# Patient Record
Sex: FEMALE | Race: ASIAN
[De-identification: names, ages, dates, MRNs, and addresses within clinical notes are randomized per-mention and may not be internally consistent; named-entity substitution may affect disease eponyms.]

---

## 2023-05-04 PROBLEM — Z00.00 ENCOUNTER FOR PREVENTIVE HEALTH EXAMINATION: Status: ACTIVE | Noted: 2023-05-04

## 2023-05-10 ENCOUNTER — APPOINTMENT (OUTPATIENT)
Dept: SURGERY | Facility: CLINIC | Age: 48
End: 2023-05-10
Payer: COMMERCIAL

## 2023-05-10 ENCOUNTER — TRANSCRIPTION ENCOUNTER (OUTPATIENT)
Age: 48
End: 2023-05-10

## 2023-05-10 VITALS
TEMPERATURE: 97.1 F | SYSTOLIC BLOOD PRESSURE: 122 MMHG | BODY MASS INDEX: 22.36 KG/M2 | HEIGHT: 64 IN | HEART RATE: 68 BPM | OXYGEN SATURATION: 99 % | WEIGHT: 131 LBS | DIASTOLIC BLOOD PRESSURE: 88 MMHG

## 2023-05-10 DIAGNOSIS — L30.9 DERMATITIS, UNSPECIFIED: ICD-10-CM

## 2023-05-10 DIAGNOSIS — D25.9 LEIOMYOMA OF UTERUS, UNSPECIFIED: ICD-10-CM

## 2023-05-10 DIAGNOSIS — Z78.9 OTHER SPECIFIED HEALTH STATUS: ICD-10-CM

## 2023-05-10 PROCEDURE — 99203 OFFICE O/P NEW LOW 30 MIN: CPT

## 2023-05-10 RX ORDER — DUPILUMAB 200 MG/1.14ML
INJECTION, SOLUTION SUBCUTANEOUS
Refills: 0 | Status: ACTIVE | COMMUNITY

## 2023-05-10 NOTE — HISTORY OF PRESENT ILLNESS
[de-identified] :  the patient comes with her  for management following her recent right breast biopsy.  She notes no recent symptoms or changes in either breast other than some occasional vague bilateral breast pain and she has no prior history of any other breast disorders or breast surgery.  A recent screening mammogram showed 2 small clusters of microcalcification in the right upper outer quadrant and a right breast nodule at 5:00.  Diagnostic imaging confirmed these findings.  The nodule was thought to be probably benign and stereotactic biopsy of the larger, more anterior grouping showed classical LCIS.  She has no family history of breast cancer.\par \par Last GYN examination was 5 months ago, menarche was at age 9 and first pregnancy was at age 28.  She is a  who nursed for 1 year and never used hormones.  Her menses are currently irregular and she believes that she is in early menopause

## 2023-05-10 NOTE — DATA REVIEWED
[FreeTextEntry1] :  reviewed reports and images from recent breast imaging studies and core biopsy as noted above

## 2023-05-10 NOTE — ASSESSMENT
[FreeTextEntry1] :  clustered calcifications in the right breast showing classical LCIS, with an additional smaller cluster posterior to this which has not been biopsied, and a small nonpalpable nodule in the right 5:00 axis for which 6-month follow-up has been advised.  The nature of classical LCIS was described in full and she understands that this is a disease without actual malignant potential or the need for definitive surgical excision.  The breast imaging studies and biopsy slides will be reviewed at this location, and a bilateral breast MRI will be obtained for further evaluation.  She understands  that she may require additional biopsies based on the MRI results, and that the second smaller area of microcalcifications may also need biopsy.  We also discussed that this disease  represents an increased risk for the later development of ductal carcinoma of the breast, and that she will require regular surveillance, and possible endocrine therapy for breast cancer risk reduction.  After the current work-up is completed, a medical oncology evaluation will be obtained in that regard.  All their questions were answered and they are happy with the assessment and plan.  They will return here in 3 to 4 weeks for reevaluation.

## 2023-05-10 NOTE — PHYSICAL EXAM
[Normal Thyroid] : the thyroid was normal [Normal Breath Sounds] : Normal breath sounds [Normal Heart Sounds] : normal heart sounds [Normal Rate and Rhythm] : normal rate and rhythm [No HSM] : no hepatosplenomegaly [de-identified] :  thin and healthy [de-identified] :  no adenopathy [de-identified] :  small and symmetrical, with no nipple discharge, nipple retraction, or suspicious skin change.  Healing core biopsy puncture  high in the right upper outer quadrant, and just below this is a vague area of increased density consistent with core biopsy site reaction.  No discrete masses or suspicious areas are palpable bilaterally.  No axillary adenopathy bilaterally

## 2023-05-19 ENCOUNTER — NON-APPOINTMENT (OUTPATIENT)
Age: 48
End: 2023-05-19

## 2023-05-22 ENCOUNTER — LABORATORY RESULT (OUTPATIENT)
Age: 48
End: 2023-05-22

## 2023-06-13 ENCOUNTER — RESULT REVIEW (OUTPATIENT)
Age: 48
End: 2023-06-13

## 2023-06-13 ENCOUNTER — OUTPATIENT (OUTPATIENT)
Dept: OUTPATIENT SERVICES | Facility: HOSPITAL | Age: 48
LOS: 1 days | End: 2023-06-13
Payer: COMMERCIAL

## 2023-06-13 DIAGNOSIS — Z85.3 PERSONAL HISTORY OF MALIGNANT NEOPLASM OF BREAST: ICD-10-CM

## 2023-06-13 DIAGNOSIS — N63.0 UNSPECIFIED LUMP IN UNSPECIFIED BREAST: ICD-10-CM

## 2023-06-13 PROCEDURE — 77049 MRI BREAST C-+ W/CAD BI: CPT | Mod: 26

## 2023-06-13 PROCEDURE — C8937: CPT

## 2023-06-13 PROCEDURE — 77049 MRI BREAST C-+ W/CAD BI: CPT

## 2023-06-13 PROCEDURE — A9579: CPT

## 2023-06-14 ENCOUNTER — NON-APPOINTMENT (OUTPATIENT)
Age: 48
End: 2023-06-14

## 2023-06-14 DIAGNOSIS — N63.0 UNSPECIFIED LUMP IN UNSPECIFIED BREAST: ICD-10-CM

## 2023-06-20 ENCOUNTER — APPOINTMENT (OUTPATIENT)
Dept: SURGERY | Facility: CLINIC | Age: 48
End: 2023-06-20
Payer: COMMERCIAL

## 2023-06-20 VITALS
TEMPERATURE: 97 F | DIASTOLIC BLOOD PRESSURE: 68 MMHG | OXYGEN SATURATION: 99 % | BODY MASS INDEX: 22.36 KG/M2 | SYSTOLIC BLOOD PRESSURE: 110 MMHG | HEIGHT: 64 IN | HEART RATE: 78 BPM | WEIGHT: 131 LBS

## 2023-06-20 PROCEDURE — 99213 OFFICE O/P EST LOW 20 MIN: CPT

## 2023-06-20 NOTE — ASSESSMENT
[FreeTextEntry1] :   Right breast cLCIS, arising in an area of clustered microcalcifications, with a second area that is as yet indeterminate.    We again reviewed the nature of LCIS and that it does not represent a malignant process and in and of itself does not require surgical resection.  We will arrange for diagnostic imaging of the right breast as advised, along with a medical oncology evaluation for possible endocrine therapy and risk reduction, as she does have an increased risk for the later development of ductal carcinoma.  She has a concern about any interaction with her Dupixent, which I do not think will be a problem, but which she can discuss with the oncologist at the time of the consultation.  She understands that she may require additional biopsies based on the diagnostic mammography, but barring any other issues, she will return here in 3 months for reexam, or sooner as needed.  She  understands the need for regular breast surveillance and imaging studies in light of her increased risk status.  All her questions were answered and she understands and agrees.

## 2023-06-20 NOTE — HISTORY OF PRESENT ILLNESS
[de-identified] : The patient returns for further discussion regarding the management of her right breast microcalcifications and LCIS, status post stereotactic core biopsy.  She notes no new symptoms or changes in either breast since she was last seen.

## 2023-06-20 NOTE — DATA REVIEWED
[FreeTextEntry1] :   Biopsy of the more anterior group of right breast microcalcifications showed classical LCIS.  Local review of the images suggested diagnostic imaging of the right breast to further evaluate the posterior calcifications.\par \par Bilateral breast MRI showed no other areas of concern in either breast or axilla.

## 2023-06-20 NOTE — PHYSICAL EXAM
[de-identified] :   Thin and healthy [de-identified] :  no adenopathy [de-identified] :  small and symmetrical, with no nipple discharge, nipple retraction, suspicious skin change bilaterally.  No new masses or suspicious areas palpable in either breast.  Small residual palpable density in the right upper outer quadrant related to the recent core biopsy, improved since last office visit.  No axillary adenopathy bilaterally.

## 2023-06-20 NOTE — REASON FOR VISIT
[Follow-Up: _____] : a [unfilled] follow-up visit [[employer] ] : provided by [employer]   [Interpreters_IDNumber] : [id number] [Interpreters_FullName] : althea [TWNoteComboBox1] : Bulgarian

## 2023-06-27 ENCOUNTER — OUTPATIENT (OUTPATIENT)
Dept: OUTPATIENT SERVICES | Facility: HOSPITAL | Age: 48
LOS: 1 days | End: 2023-06-27
Payer: COMMERCIAL

## 2023-06-27 ENCOUNTER — RESULT REVIEW (OUTPATIENT)
Age: 48
End: 2023-06-27

## 2023-06-27 DIAGNOSIS — D05.01 LOBULAR CARCINOMA IN SITU OF RIGHT BREAST: ICD-10-CM

## 2023-06-27 DIAGNOSIS — R92.8 OTHER ABNORMAL AND INCONCLUSIVE FINDINGS ON DIAGNOSTIC IMAGING OF BREAST: ICD-10-CM

## 2023-06-27 PROCEDURE — 77065 DX MAMMO INCL CAD UNI: CPT | Mod: 26,RT

## 2023-06-27 PROCEDURE — G0279: CPT | Mod: 26

## 2023-06-27 PROCEDURE — 77065 DX MAMMO INCL CAD UNI: CPT | Mod: RT

## 2023-06-27 PROCEDURE — G0279: CPT

## 2023-06-28 DIAGNOSIS — R92.8 OTHER ABNORMAL AND INCONCLUSIVE FINDINGS ON DIAGNOSTIC IMAGING OF BREAST: ICD-10-CM

## 2023-07-26 ENCOUNTER — APPOINTMENT (OUTPATIENT)
Dept: HEMATOLOGY ONCOLOGY | Facility: CLINIC | Age: 48
End: 2023-07-26
Payer: COMMERCIAL

## 2023-07-26 ENCOUNTER — OUTPATIENT (OUTPATIENT)
Dept: OUTPATIENT SERVICES | Facility: HOSPITAL | Age: 48
LOS: 1 days | End: 2023-07-26
Payer: COMMERCIAL

## 2023-07-26 VITALS
WEIGHT: 130 LBS | DIASTOLIC BLOOD PRESSURE: 63 MMHG | TEMPERATURE: 97.2 F | HEART RATE: 63 BPM | HEIGHT: 64 IN | SYSTOLIC BLOOD PRESSURE: 122 MMHG | BODY MASS INDEX: 22.2 KG/M2

## 2023-07-26 DIAGNOSIS — D05.01 LOBULAR CARCINOMA IN SITU OF RIGHT BREAST: ICD-10-CM

## 2023-07-26 PROCEDURE — 99204 OFFICE O/P NEW MOD 45 MIN: CPT

## 2023-07-27 DIAGNOSIS — D05.01 LOBULAR CARCINOMA IN SITU OF RIGHT BREAST: ICD-10-CM

## 2023-07-27 NOTE — HISTORY OF PRESENT ILLNESS
[de-identified] : BART is a 47 year old premenopausal female with right breast microcalcifications and LCIS, referred by Dr Horvath for possible endocrine therapy and risk reduction. She does not have significant medical history except for uterine myomectomy. \par \par Core biopsy of the right breast calcification on 4/18/23 showed lobular carcinoma in situ, lobular type and the tissue showed atypical lobular hyperplasia. Right dx mammo on 7/27/23 was BI-RADS 3, showed Right breast calcifications with Surgical management recommended. If not surgically removed, short-term follow-up is recommended in 6 months.\par \par She denies any new complaints.  Patient denies any new palpable breast lumps or pain, denies skin changes, denies nipple discharge.  Patient denies cough, shortness of breath, denies fever, denies bone pain.\par \par

## 2023-07-27 NOTE — ASSESSMENT
[FreeTextEntry1] : BART is a 47 year old female with right breast LCIS,  She does not have significant medical history except for eczema and she is on Dupixent.\par \par \par RECOMMENDATIONS.\par I had a detailed discussion with the pt about the natural history and management of LCIS.\par She was advised that LCIS is a risk factor for development of invasive breast cancer.\par The relative risk of developing an invasive cancer in women with LCIS is approximately 7 to 11 fold higher than for women without LCIS.. The absolute risk is approximately 1 percent per year and appears to be lifelong.\par \par Options for chemoprophylaxis  with Tamoxifen 5mg daily X 3 yrs were discussed\par \par Side effects of Tamoxifen including hot flashes, menstrual irregularities, weight gain, mood changes, DVT, cataracts and uterine cancer were discussed.\par \par \par We also discussed surveillance with annual mammograms and  MRI breast.\par  She was also informed that there are no reported interactions between Dupixent and Tamoxifen.\par \par Pt is not sure if she wants to proceed with Tamoxifen. She will inform us of her decision\par All of her questions and concerns were addressed.\par \par \par

## 2023-07-27 NOTE — REVIEW OF SYSTEMS
[Diarrhea: Grade 0] : Diarrhea: Grade 0 [Skin Rash] : skin rash [Negative] : Allergic/Immunologic [FreeTextEntry2] : last GYN 2023,

## 2023-07-27 NOTE — PHYSICAL EXAM
[Fully active, able to carry on all pre-disease performance without restriction] : Status 0 - Fully active, able to carry on all pre-disease performance without restriction [Normal] : affect appropriate [de-identified] : eczematous rash on face and other areas

## 2023-07-27 NOTE — RESULTS/DATA
[FreeTextEntry1] : ACC: [id number]     EXAM:  MG MAMMO DIAG W [facility] RT#   ORDERED BY: YOUSUF GAN\par PROCEDURE DATE:  06/27/2023\par \par INTERPRETATION:  CLINICAL HISTORY: Additional imaging requested from consult of outside mammograms. Further evaluation requested for right breast calcifications.\par \par FAMILY HISTORY: None.\par \par The patient reports her last clinical breast examination was performed within the past year.\par \par COMPARISON: 3/22/2023.\par \par BREAST COMPOSITION: The breasts are heterogeneously dense, which may obscure small masses.\par \par VIEWS: 2D/tomosynthesis CC/MLO and spot magnification views of the right breast. Computer-aided detection was utilized by the radiologists in the interpretation of this examination.\par \par FINDINGS:\par There are 3 groups of amorphous calcifications in the upper outer quadrant of the right breast. Biopsy clip is noted superior and lateral to the calcifications consistent with the biopsy-proven atypia. Surgical management is recommended. No suspicious masses or areas of architectural distortion are seen.\par \par IMPRESSION:\par 1. Right breast calcifications as above. Surgical management is recommended. If not surgically removed, short-term follow-up is recommended in 6 months.\par 2. The patient will also be due for short-term follow-up of the probably benign right breast mass identified on outside imaging.\par \par Recommendation: Follow-up unilateral diagnostic mammogram and ultrasound in 3 months.\par \par BI-RADS category 3: Probably Benign\par \par

## 2023-09-18 ENCOUNTER — APPOINTMENT (OUTPATIENT)
Dept: SURGERY | Facility: CLINIC | Age: 48
End: 2023-09-18
Payer: COMMERCIAL

## 2023-09-18 VITALS
HEART RATE: 80 BPM | TEMPERATURE: 97.8 F | BODY MASS INDEX: 22.2 KG/M2 | WEIGHT: 130 LBS | OXYGEN SATURATION: 99 % | HEIGHT: 64 IN | SYSTOLIC BLOOD PRESSURE: 102 MMHG | DIASTOLIC BLOOD PRESSURE: 74 MMHG

## 2023-09-18 DIAGNOSIS — R92.0 MAMMOGRAPHIC MICROCALCIFICATION FOUND ON DIAGNOSTIC IMAGING OF BREAST: ICD-10-CM

## 2023-09-18 PROCEDURE — 99213 OFFICE O/P EST LOW 20 MIN: CPT

## 2023-11-07 ENCOUNTER — NON-APPOINTMENT (OUTPATIENT)
Age: 48
End: 2023-11-07

## 2023-12-01 ENCOUNTER — OUTPATIENT (OUTPATIENT)
Dept: OUTPATIENT SERVICES | Facility: HOSPITAL | Age: 48
LOS: 1 days | End: 2023-12-01
Payer: COMMERCIAL

## 2023-12-01 ENCOUNTER — RESULT REVIEW (OUTPATIENT)
Age: 48
End: 2023-12-01

## 2023-12-01 DIAGNOSIS — Z85.3 PERSONAL HISTORY OF MALIGNANT NEOPLASM OF BREAST: ICD-10-CM

## 2023-12-01 DIAGNOSIS — D05.01 LOBULAR CARCINOMA IN SITU OF RIGHT BREAST: ICD-10-CM

## 2023-12-01 PROCEDURE — 77049 MRI BREAST C-+ W/CAD BI: CPT

## 2023-12-01 PROCEDURE — C8937: CPT

## 2023-12-01 PROCEDURE — A9579: CPT

## 2023-12-01 PROCEDURE — 77049 MRI BREAST C-+ W/CAD BI: CPT | Mod: 26

## 2023-12-02 DIAGNOSIS — D05.01 LOBULAR CARCINOMA IN SITU OF RIGHT BREAST: ICD-10-CM

## 2024-01-23 ENCOUNTER — OUTPATIENT (OUTPATIENT)
Dept: OUTPATIENT SERVICES | Facility: HOSPITAL | Age: 49
LOS: 1 days | End: 2024-01-23
Payer: COMMERCIAL

## 2024-01-23 ENCOUNTER — APPOINTMENT (OUTPATIENT)
Dept: HEMATOLOGY ONCOLOGY | Facility: CLINIC | Age: 49
End: 2024-01-23
Payer: COMMERCIAL

## 2024-01-23 VITALS
DIASTOLIC BLOOD PRESSURE: 67 MMHG | SYSTOLIC BLOOD PRESSURE: 117 MMHG | BODY MASS INDEX: 23.56 KG/M2 | HEIGHT: 64 IN | HEART RATE: 63 BPM | TEMPERATURE: 98 F | WEIGHT: 138 LBS

## 2024-01-23 DIAGNOSIS — D05.01 LOBULAR CARCINOMA IN SITU OF RIGHT BREAST: ICD-10-CM

## 2024-01-23 PROCEDURE — 99214 OFFICE O/P EST MOD 30 MIN: CPT

## 2024-01-23 RX ORDER — TAMOXIFEN CITRATE 10 MG/1
10 TABLET, FILM COATED ORAL DAILY
Qty: 45 | Refills: 1 | Status: ACTIVE | COMMUNITY
Start: 2023-07-26 | End: 1900-01-01

## 2024-01-23 NOTE — HISTORY OF PRESENT ILLNESS
[de-identified] : BART is a 47 year old premenopausal female with right breast microcalcifications and LCIS, referred by Dr Horvath for possible endocrine therapy and risk reduction. She does not have significant medical history except for uterine myomectomy. \par  \par  Core biopsy of the right breast calcification on 4/18/23 showed lobular carcinoma in situ, lobular type and the tissue showed atypical lobular hyperplasia. Right dx mammo on 7/27/23 was BI-RADS 3, showed Right breast calcifications with Surgical management recommended. If not surgically removed, short-term follow-up is recommended in 6 months.\par  \par  She denies any new complaints.  Patient denies any new palpable breast lumps or pain, denies skin changes, denies nipple discharge.  Patient denies cough, shortness of breath, denies fever, denies bone pain.\par  \par   [de-identified] : 1/23/24 Pt is here for a follow up. Accompanied by family member. She does have menstrual periods and they are normal. Her psoriasis is under control as well.  She states she had never had a colonoscopy. Her last Gynecologist visit was last year in March 2023.She is otherwise well.  She is currently not taking Tamoxifen and missed about two weeks because of insurance and pharmacy change. She denies any new complaints.

## 2024-01-23 NOTE — REVIEW OF SYSTEMS
[Diarrhea: Grade 0] : Diarrhea: Grade 0 [Skin Rash] : skin rash [Negative] : Heme/Lymph [FreeTextEntry2] : last GYN 2023,

## 2024-01-23 NOTE — ASSESSMENT
[FreeTextEntry1] : BART is a 48 year old female with right breast LCIS, She does not have significant medical history except for eczema and she is on Dupixent.      RECOMMENDATIONS.  I had a detailed discussion with the pt about the natural history and management of LCIS.  She was advised that LCIS is a risk factor for development of invasive breast cancer.  The relative risk of developing an invasive cancer in women with LCIS is approximately 7 to 11 fold higher than for women without LCIS.. The absolute risk is approximately 1 percent per year and appears to be lifelong.    Options for chemoprophylaxis with Tamoxifen 5mg daily X 3 yrs were discussed. She decided to take it and has been on it since 7/23    Side effects of Tamoxifen including hot flashes, menstrual irregularities, weight gain, mood changes, DVT, cataracts and uterine cancer were discussed.      We also discussed surveillance with annual mammograms and MRI breast.   She was also informed that there are no reported interactions between Dupixent and Tamoxifen.    Pt to continue Tamoxifen.  MRI from 12/23 reviewed BIRADS-2  Mammo in 6/23 BIRADS 3 Due for a mammogram, recommended follow up with Dr Horvath regarding Mammogram  Follow up with gastroenterologist and colonoscopy is recommended. Emphasized on follow up with Gynecologist. All of her questions and concerns were addressed.

## 2024-01-23 NOTE — PHYSICAL EXAM
[Fully active, able to carry on all pre-disease performance without restriction] : Status 0 - Fully active, able to carry on all pre-disease performance without restriction [Normal] : affect appropriate [de-identified] : eczematous rash on face and other areas

## 2024-01-24 DIAGNOSIS — D05.01 LOBULAR CARCINOMA IN SITU OF RIGHT BREAST: ICD-10-CM

## 2024-03-18 ENCOUNTER — APPOINTMENT (OUTPATIENT)
Dept: SURGERY | Facility: CLINIC | Age: 49
End: 2024-03-18
Payer: COMMERCIAL

## 2024-03-18 VITALS
SYSTOLIC BLOOD PRESSURE: 112 MMHG | HEART RATE: 67 BPM | BODY MASS INDEX: 23.05 KG/M2 | TEMPERATURE: 97.1 F | OXYGEN SATURATION: 99 % | HEIGHT: 64 IN | WEIGHT: 135 LBS | DIASTOLIC BLOOD PRESSURE: 68 MMHG

## 2024-03-18 DIAGNOSIS — D05.01 LOBULAR CARCINOMA IN SITU OF RIGHT BREAST: ICD-10-CM

## 2024-03-18 PROCEDURE — 99212 OFFICE O/P EST SF 10 MIN: CPT

## 2024-03-18 NOTE — HISTORY OF PRESENT ILLNESS
[de-identified] : The patient returns for her scheduled breast surveillance visit, and she notes no new symptoms or changes in either breast.  She remains on tamoxifen 5 mg/day per Dr. Wills and has no adverse effects.

## 2024-03-18 NOTE — PHYSICAL EXAM
[de-identified] : Thin and healthy [de-identified] : No adenopathy [de-identified] : Small and symmetrical, no nipple discharge, nipple retraction, suspicious skin change bilaterally.  No discrete masses or suspicious areas are palpable in either breast.  No axillary adenopathy bilaterally.

## 2024-07-05 ENCOUNTER — RESULT REVIEW (OUTPATIENT)
Age: 49
End: 2024-07-05

## 2024-07-05 ENCOUNTER — OUTPATIENT (OUTPATIENT)
Dept: OUTPATIENT SERVICES | Facility: HOSPITAL | Age: 49
LOS: 1 days | End: 2024-07-05
Payer: COMMERCIAL

## 2024-07-05 DIAGNOSIS — R92.2 INCONCLUSIVE MAMMOGRAM: ICD-10-CM

## 2024-07-05 DIAGNOSIS — R92.8 OTHER ABNORMAL AND INCONCLUSIVE FINDINGS ON DIAGNOSTIC IMAGING OF BREAST: ICD-10-CM

## 2024-07-05 PROCEDURE — 76642 ULTRASOUND BREAST LIMITED: CPT | Mod: 26,RT

## 2024-07-05 PROCEDURE — 77062 BREAST TOMOSYNTHESIS BI: CPT | Mod: 26

## 2024-07-05 PROCEDURE — G0279: CPT | Mod: 26

## 2024-07-05 PROCEDURE — 77066 DX MAMMO INCL CAD BI: CPT | Mod: 26

## 2024-07-05 PROCEDURE — 76642 ULTRASOUND BREAST LIMITED: CPT | Mod: RT

## 2024-07-05 PROCEDURE — 77066 DX MAMMO INCL CAD BI: CPT

## 2024-07-05 PROCEDURE — G0279: CPT

## 2024-07-06 DIAGNOSIS — R92.8 OTHER ABNORMAL AND INCONCLUSIVE FINDINGS ON DIAGNOSTIC IMAGING OF BREAST: ICD-10-CM

## 2024-07-24 ENCOUNTER — OUTPATIENT (OUTPATIENT)
Dept: OUTPATIENT SERVICES | Facility: HOSPITAL | Age: 49
LOS: 1 days | End: 2024-07-24
Payer: COMMERCIAL

## 2024-07-24 ENCOUNTER — APPOINTMENT (OUTPATIENT)
Age: 49
End: 2024-07-24
Payer: COMMERCIAL

## 2024-07-24 VITALS
TEMPERATURE: 98.2 F | SYSTOLIC BLOOD PRESSURE: 112 MMHG | BODY MASS INDEX: 22.88 KG/M2 | HEART RATE: 99 BPM | OXYGEN SATURATION: 99 % | DIASTOLIC BLOOD PRESSURE: 73 MMHG | WEIGHT: 134.04 LBS | HEIGHT: 64 IN

## 2024-07-24 DIAGNOSIS — D05.01 LOBULAR CARCINOMA IN SITU OF RIGHT BREAST: ICD-10-CM

## 2024-07-24 DIAGNOSIS — R92.0 MAMMOGRAPHIC MICROCALCIFICATION FOUND ON DIAGNOSTIC IMAGING OF BREAST: ICD-10-CM

## 2024-07-24 PROCEDURE — 99214 OFFICE O/P EST MOD 30 MIN: CPT

## 2024-07-24 PROCEDURE — G2211 COMPLEX E/M VISIT ADD ON: CPT | Mod: NC

## 2024-07-24 NOTE — PHYSICAL EXAM
[Fully active, able to carry on all pre-disease performance without restriction] : Status 0 - Fully active, able to carry on all pre-disease performance without restriction [Normal] : affect appropriate [de-identified] : eczematous rash on face and other areas

## 2024-07-24 NOTE — ASSESSMENT
[FreeTextEntry1] : BART is a 48 year old female with right breast LCIS, She does not have significant medical history except for eczema and she is on Dupixent.      RECOMMENDATIONS.  I had a detailed discussion with the pt about the natural history and management of LCIS.  She was advised that LCIS is a risk factor for development of invasive breast cancer.  The relative risk of developing an invasive cancer in women with LCIS is approximately 7 to 11 fold higher than for women without LCIS.. The absolute risk is approximately 1 percent per year and appears to be lifelong.    Options for chemoprophylaxis with Tamoxifen 5mg daily X 3 yrs were discussed. She decided to take it and has been on it since 7/23    Side effects of Tamoxifen including hot flashes, menstrual irregularities, weight gain, mood changes, DVT, cataracts and uterine cancer were discussed.      We also discussed surveillance with mammograms and MRI breast.   She was also informed that there are no reported interactions between Dupixent and Tamoxifen.    Pt to continue Tamoxifen.  MRI from 12/23 reviewed BIRADS-2  Mammo in 7/24BIRADS 3 Due for a RT diag mammogram in 3 m, recommended follow up with Dr Horvath.  Follow up with gastroenterologist and colonoscopy is recommended. Emphasized on follow up with Gynecologist. All of her questions and concerns were addressed.

## 2024-07-24 NOTE — HISTORY OF PRESENT ILLNESS
[de-identified] : BART is a 47 year old premenopausal female with right breast microcalcifications and LCIS, referred by Dr Horvath for possible endocrine therapy and risk reduction. She does not have significant medical history except for uterine myomectomy. \par  \par  Core biopsy of the right breast calcification on 4/18/23 showed lobular carcinoma in situ, lobular type and the tissue showed atypical lobular hyperplasia. Right dx mammo on 7/27/23 was BI-RADS 3, showed Right breast calcifications with Surgical management recommended. If not surgically removed, short-term follow-up is recommended in 6 months.\par  \par  She denies any new complaints.  Patient denies any new palpable breast lumps or pain, denies skin changes, denies nipple discharge.  Patient denies cough, shortness of breath, denies fever, denies bone pain.\par  \par   [de-identified] : 1/23/24 Pt is here for a follow up. Accompanied by family member. She does have menstrual periods and they are normal. Her psoriasis is under control as well.  She states she had never had a colonoscopy. Her last Gynecologist visit was last year in March 2023.She is otherwise well.  She is currently not taking Tamoxifen and missed about two weeks because of insurance and pharmacy change. She denies any new complaints.  7/24/24  Patient here for follow up, feeling well.  She denies any new complaints.  Patient denies any new palpable breast lumps or pain, denies skin changes, denies nipple discharge.  Patient denies cough, shortness of breath, denies fever, denies bone pain. Taking Tamoxifen and tolerating it well.

## 2024-07-25 DIAGNOSIS — R92.0 MAMMOGRAPHIC MICROCALCIFICATION FOUND ON DIAGNOSTIC IMAGING OF BREAST: ICD-10-CM

## 2024-09-16 ENCOUNTER — APPOINTMENT (OUTPATIENT)
Dept: SURGERY | Facility: CLINIC | Age: 49
End: 2024-09-16
Payer: COMMERCIAL

## 2024-09-16 VITALS
HEART RATE: 80 BPM | TEMPERATURE: 97 F | OXYGEN SATURATION: 98 % | HEIGHT: 64 IN | SYSTOLIC BLOOD PRESSURE: 128 MMHG | WEIGHT: 134 LBS | DIASTOLIC BLOOD PRESSURE: 70 MMHG | BODY MASS INDEX: 22.88 KG/M2

## 2024-09-16 DIAGNOSIS — R92.0 MAMMOGRAPHIC MICROCALCIFICATION FOUND ON DIAGNOSTIC IMAGING OF BREAST: ICD-10-CM

## 2024-09-16 DIAGNOSIS — D05.01 LOBULAR CARCINOMA IN SITU OF RIGHT BREAST: ICD-10-CM

## 2024-09-16 PROCEDURE — 99212 OFFICE O/P EST SF 10 MIN: CPT

## 2024-09-17 ENCOUNTER — TRANSCRIPTION ENCOUNTER (OUTPATIENT)
Age: 49
End: 2024-09-17

## 2024-09-17 NOTE — ASSESSMENT
[FreeTextEntry1] : Benign breast surveillance examination.  The patient will return in 6 months for reexam, or sooner as needed.  A right diagnostic mammogram and bilateral breast MRI are due in January and an appropriate requisition was provided.  All her questions were answered.

## 2024-09-17 NOTE — PHYSICAL EXAM
[de-identified] : Thin and healthy [de-identified] : No adenopathy [de-identified] : Small and symmetrical, mixed fatty and glandular consistency, no nipple discharge, nipple retraction, suspicious skin change bilaterally.  No discrete masses or suspicious areas palpable in either breast slight indentation and paucity of breast tissue at the old left breast excision site.  No axillary adenopathy bilaterally.

## 2024-09-17 NOTE — DATA REVIEWED
[FreeTextEntry1] : July 2024 bilateral mammogram was benign, but a 6-month follow-up was advised for the right breast.

## 2024-09-17 NOTE — HISTORY OF PRESENT ILLNESS
[de-identified] : The patient returns for her scheduled breast surveillance examination.  She looks and feels well, and she notes no new symptoms or changes in either breast.  She is on low-dose tamoxifen via Dr. Wills at 5 mg/day and she does not report any ill effects in that regard.  Prior diagnosis of right breast LCIS on core biopsy, not excised

## 2024-12-16 ENCOUNTER — NON-APPOINTMENT (OUTPATIENT)
Age: 49
End: 2024-12-16

## 2025-01-02 ENCOUNTER — RESULT REVIEW (OUTPATIENT)
Age: 50
End: 2025-01-02

## 2025-01-02 ENCOUNTER — OUTPATIENT (OUTPATIENT)
Dept: OUTPATIENT SERVICES | Facility: HOSPITAL | Age: 50
LOS: 1 days | End: 2025-01-02
Payer: COMMERCIAL

## 2025-01-02 DIAGNOSIS — R92.8 OTHER ABNORMAL AND INCONCLUSIVE FINDINGS ON DIAGNOSTIC IMAGING OF BREAST: ICD-10-CM

## 2025-01-02 PROCEDURE — G0279: CPT

## 2025-01-02 PROCEDURE — 77065 DX MAMMO INCL CAD UNI: CPT | Mod: RT

## 2025-01-02 PROCEDURE — 77065 DX MAMMO INCL CAD UNI: CPT | Mod: 26,RT

## 2025-01-02 PROCEDURE — 77061 BREAST TOMOSYNTHESIS UNI: CPT | Mod: 26,RT

## 2025-01-02 PROCEDURE — 76642 ULTRASOUND BREAST LIMITED: CPT | Mod: 26,RT

## 2025-01-02 PROCEDURE — 76642 ULTRASOUND BREAST LIMITED: CPT | Mod: RT

## 2025-01-02 PROCEDURE — G0279: CPT | Mod: 26

## 2025-01-03 DIAGNOSIS — R92.8 OTHER ABNORMAL AND INCONCLUSIVE FINDINGS ON DIAGNOSTIC IMAGING OF BREAST: ICD-10-CM

## 2025-01-06 ENCOUNTER — RESULT REVIEW (OUTPATIENT)
Age: 50
End: 2025-01-06

## 2025-01-06 ENCOUNTER — OUTPATIENT (OUTPATIENT)
Dept: OUTPATIENT SERVICES | Facility: HOSPITAL | Age: 50
LOS: 1 days | End: 2025-01-06
Payer: COMMERCIAL

## 2025-01-06 DIAGNOSIS — Z00.8 ENCOUNTER FOR OTHER GENERAL EXAMINATION: ICD-10-CM

## 2025-01-06 DIAGNOSIS — D05.01 LOBULAR CARCINOMA IN SITU OF RIGHT BREAST: ICD-10-CM

## 2025-01-06 PROCEDURE — A9579: CPT

## 2025-01-06 PROCEDURE — 77049 MRI BREAST C-+ W/CAD BI: CPT

## 2025-01-06 PROCEDURE — 77049 MRI BREAST C-+ W/CAD BI: CPT | Mod: 26

## 2025-01-06 PROCEDURE — C8937: CPT

## 2025-01-07 DIAGNOSIS — D05.01 LOBULAR CARCINOMA IN SITU OF RIGHT BREAST: ICD-10-CM

## 2025-01-14 ENCOUNTER — OUTPATIENT (OUTPATIENT)
Dept: OUTPATIENT SERVICES | Facility: HOSPITAL | Age: 50
LOS: 1 days | End: 2025-01-14
Payer: COMMERCIAL

## 2025-01-14 ENCOUNTER — APPOINTMENT (OUTPATIENT)
Age: 50
End: 2025-01-14
Payer: COMMERCIAL

## 2025-01-14 VITALS
HEIGHT: 63.78 IN | DIASTOLIC BLOOD PRESSURE: 75 MMHG | SYSTOLIC BLOOD PRESSURE: 126 MMHG | BODY MASS INDEX: 22.86 KG/M2 | WEIGHT: 132.28 LBS | TEMPERATURE: 98.4 F | HEART RATE: 66 BPM | OXYGEN SATURATION: 99 %

## 2025-01-14 DIAGNOSIS — D05.01 LOBULAR CARCINOMA IN SITU OF RIGHT BREAST: ICD-10-CM

## 2025-01-14 DIAGNOSIS — Z51.81 ENCOUNTER FOR THERAPEUTIC DRUG LVL MONITORING: ICD-10-CM

## 2025-01-14 DIAGNOSIS — R92.0 MAMMOGRAPHIC MICROCALCIFICATION FOUND ON DIAGNOSTIC IMAGING OF BREAST: ICD-10-CM

## 2025-01-14 DIAGNOSIS — Z79.810 ENCOUNTER FOR THERAPEUTIC DRUG LVL MONITORING: ICD-10-CM

## 2025-01-14 PROCEDURE — 99214 OFFICE O/P EST MOD 30 MIN: CPT

## 2025-01-15 DIAGNOSIS — D05.01 LOBULAR CARCINOMA IN SITU OF RIGHT BREAST: ICD-10-CM

## 2025-03-17 ENCOUNTER — APPOINTMENT (OUTPATIENT)
Dept: SURGERY | Facility: CLINIC | Age: 50
End: 2025-03-17
Payer: COMMERCIAL

## 2025-03-17 VITALS
SYSTOLIC BLOOD PRESSURE: 124 MMHG | WEIGHT: 132 LBS | DIASTOLIC BLOOD PRESSURE: 70 MMHG | OXYGEN SATURATION: 98 % | HEART RATE: 70 BPM | TEMPERATURE: 97 F | BODY MASS INDEX: 22.81 KG/M2 | HEIGHT: 63.78 IN

## 2025-03-17 DIAGNOSIS — D05.01 LOBULAR CARCINOMA IN SITU OF RIGHT BREAST: ICD-10-CM

## 2025-03-17 PROCEDURE — 99212 OFFICE O/P EST SF 10 MIN: CPT

## 2025-04-11 ENCOUNTER — TRANSCRIPTION ENCOUNTER (OUTPATIENT)
Age: 50
End: 2025-04-11

## 2025-05-05 ENCOUNTER — TRANSCRIPTION ENCOUNTER (OUTPATIENT)
Age: 50
End: 2025-05-05

## 2025-07-07 ENCOUNTER — APPOINTMENT (OUTPATIENT)
Age: 50
End: 2025-07-07

## 2025-07-14 ENCOUNTER — OUTPATIENT (OUTPATIENT)
Dept: OUTPATIENT SERVICES | Facility: HOSPITAL | Age: 50
LOS: 1 days | End: 2025-07-14
Payer: COMMERCIAL

## 2025-07-14 ENCOUNTER — RESULT REVIEW (OUTPATIENT)
Age: 50
End: 2025-07-14

## 2025-07-14 DIAGNOSIS — R92.8 OTHER ABNORMAL AND INCONCLUSIVE FINDINGS ON DIAGNOSTIC IMAGING OF BREAST: ICD-10-CM

## 2025-07-14 PROCEDURE — 77066 DX MAMMO INCL CAD BI: CPT | Mod: 26

## 2025-07-14 PROCEDURE — 76642 ULTRASOUND BREAST LIMITED: CPT | Mod: RT

## 2025-07-14 PROCEDURE — 77062 BREAST TOMOSYNTHESIS BI: CPT | Mod: 26

## 2025-07-14 PROCEDURE — 77066 DX MAMMO INCL CAD BI: CPT

## 2025-07-14 PROCEDURE — G0279: CPT

## 2025-07-14 PROCEDURE — 76642 ULTRASOUND BREAST LIMITED: CPT | Mod: 26,RT

## 2025-07-15 DIAGNOSIS — R92.8 OTHER ABNORMAL AND INCONCLUSIVE FINDINGS ON DIAGNOSTIC IMAGING OF BREAST: ICD-10-CM

## 2025-07-21 ENCOUNTER — APPOINTMENT (OUTPATIENT)
Age: 50
End: 2025-07-21
Payer: COMMERCIAL

## 2025-07-21 VITALS
HEIGHT: 63.78 IN | DIASTOLIC BLOOD PRESSURE: 63 MMHG | BODY MASS INDEX: 23.68 KG/M2 | RESPIRATION RATE: 17 BRPM | OXYGEN SATURATION: 97 % | WEIGHT: 137 LBS | SYSTOLIC BLOOD PRESSURE: 101 MMHG | HEART RATE: 70 BPM | TEMPERATURE: 97.9 F

## 2025-07-21 DIAGNOSIS — Z51.81 ENCOUNTER FOR THERAPEUTIC DRUG LVL MONITORING: ICD-10-CM

## 2025-07-21 DIAGNOSIS — D05.01 LOBULAR CARCINOMA IN SITU OF RIGHT BREAST: ICD-10-CM

## 2025-07-21 DIAGNOSIS — Z79.810 ENCOUNTER FOR THERAPEUTIC DRUG LVL MONITORING: ICD-10-CM

## 2025-07-21 PROCEDURE — 99213 OFFICE O/P EST LOW 20 MIN: CPT
